# Patient Record
Sex: MALE | Race: ASIAN | Employment: STUDENT | URBAN - METROPOLITAN AREA
[De-identification: names, ages, dates, MRNs, and addresses within clinical notes are randomized per-mention and may not be internally consistent; named-entity substitution may affect disease eponyms.]

---
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
## 2025-01-02 ENCOUNTER — OFFICE VISIT (OUTPATIENT)
Dept: OTOLARYNGOLOGY | Facility: CLINIC | Age: 20
End: 2025-01-02
Payer: COMMERCIAL

## 2025-01-02 DIAGNOSIS — H72.91 PERFORATION OF TYMPANIC MEMBRANE, RIGHT: Primary | ICD-10-CM

## 2025-01-02 NOTE — LETTER
1/2/2025      Ulysses Diego  [address] [place] [address]      Dear Colleague,    Thank you for referring your patient, Ulysses Diego, to the [facility]. Please see a copy of my visit note below.    Assessment & Plan    Small RIGHT TM persists asymptomatically despite steripatch x 2.   6 M f/up with audio to consider surgical repair    Problem List Items Addressed This Visit    None  Visit Diagnoses       Perforation of tympanic membrane, right    -  Primary    Relevant Orders    Adult Audiology  Referral                5 minutes spent on the date of the encounter doing chart review, history and exam, documentation and further activities per the note  {     DENISE Fitzpatrick  [facility]    Subjective     HPI-3 month paper patch placed 07/24 by NB     Last Visit:  Assessment & Plan  Asymmptomatic, small and persistent perf, will continue to observe.    3M with audio      Perforation of tympanic membrane, right    -  Primary     Relevant Orders     BINOCULAR MICROSCOPY (Completed)          HPI-   Patient seen by Dr Moss and tubes placed b/l 2017 for ETD, they discussed removing the tubes in 2020 but Right remained in place until patient saw Dr Fairbansk this April, when it was removed and sterie patch placed.  He was seen again in July and Had right sterie patch again placed. No symtpoms          Interim Hx:    Audio:patient missed    Today, no complaints.  No drainage no pain or hearing loss.      Review of Systems   ENT as above      Objective    There were no vitals taken for this visit.    Physical Exam     Right TM with small   perf, slightly smaller and more circular than last check, , no drainage or erythema.           Again, thank you for allowing me to participate in the care of your patient.        Sincerely,        DENISE Fitzpatrick    Electronically signed